# Patient Record
Sex: FEMALE | Race: WHITE | NOT HISPANIC OR LATINO | Employment: OTHER | URBAN - METROPOLITAN AREA
[De-identification: names, ages, dates, MRNs, and addresses within clinical notes are randomized per-mention and may not be internally consistent; named-entity substitution may affect disease eponyms.]

---

## 2017-02-12 LAB
EXTERNAL HEMATOCRIT: 35.3 %
EXTERNAL HEMOGLOBIN: 12.4 G/DL
EXTERNAL SYPHILIS RPR SCREEN: NORMAL

## 2017-08-29 LAB
EXTERNAL ABO GROUPING: NORMAL
EXTERNAL ANTIBODY SCREEN: NORMAL
EXTERNAL HEMATOCRIT: 35.6 %
EXTERNAL HEMOGLOBIN: 12.4 G/DL
EXTERNAL HEPATITIS B SURFACE ANTIGEN: NEGATIVE
EXTERNAL HIV-1 ANTIBODY: NEGATIVE
EXTERNAL PLATELET COUNT: 200 K/ΜL
EXTERNAL RH FACTOR: POSITIVE
EXTERNAL RUBELLA IGG QUANTITATION: NORMAL
EXTERNAL SYPHILIS RPR SCREEN: NORMAL

## 2017-12-12 LAB — EXTERNAL PLATELET COUNT: 186 K/ΜL

## 2018-02-05 PROCEDURE — 87653 STREP B DNA AMP PROBE: CPT | Performed by: NURSE PRACTITIONER

## 2018-02-06 ENCOUNTER — LAB REQUISITION (OUTPATIENT)
Dept: LAB | Facility: HOSPITAL | Age: 33
End: 2018-02-06
Payer: COMMERCIAL

## 2018-02-06 DIAGNOSIS — Z34.83 ENCOUNTER FOR SUPERVISION OF OTHER NORMAL PREGNANCY, THIRD TRIMESTER: ICD-10-CM

## 2018-02-08 LAB — GP B STREP DNA SPEC QL NAA+PROBE: NORMAL

## 2018-02-24 ENCOUNTER — HOSPITAL ENCOUNTER (INPATIENT)
Facility: HOSPITAL | Age: 33
LOS: 1 days | Discharge: HOME | End: 2018-02-25
Attending: OBSTETRICS & GYNECOLOGY | Admitting: OBSTETRICS & GYNECOLOGY
Payer: COMMERCIAL

## 2018-02-24 DIAGNOSIS — Z3A.38 38 WEEKS GESTATION OF PREGNANCY: ICD-10-CM

## 2018-02-24 LAB
ABO GROUP BLD: NORMAL
BASE EXCESS BLDCOA CALC-SCNC: -1.1 MMOL/L (ref 3–11)
BASE EXCESS BLDCOV CALC-SCNC: 0.2 MMOL/L (ref 1–9)
BASOPHILS # BLD MANUAL: 0 THOUSAND/UL (ref 0–0.1)
BASOPHILS NFR MAR MANUAL: 0 % (ref 0–1)
BLD GP AB SCN SERPL QL: NEGATIVE
EOSINOPHIL # BLD MANUAL: 0 THOUSAND/UL (ref 0–0.4)
EOSINOPHIL NFR BLD MANUAL: 0 % (ref 0–6)
ERYTHROCYTE [DISTWIDTH] IN BLOOD BY AUTOMATED COUNT: 13.1 % (ref 11.6–15.1)
HCO3 BLDCOA-SCNC: 24.5 MMOL/L (ref 17.3–27.3)
HCO3 BLDCOV-SCNC: 21.6 MMOL/L (ref 12.2–28.6)
HCT VFR BLD AUTO: 34.2 % (ref 34.8–46.1)
HGB BLD-MCNC: 11.5 G/DL (ref 11.5–15.4)
LG PLATELETS BLD QL SMEAR: PRESENT
LYMPHOCYTES # BLD AUTO: 1.44 THOUSAND/UL (ref 0.6–4.47)
LYMPHOCYTES # BLD AUTO: 13 % (ref 14–44)
MCH RBC QN AUTO: 30.7 PG (ref 26.8–34.3)
MCHC RBC AUTO-ENTMCNC: 33.6 G/DL (ref 31.4–37.4)
MCV RBC AUTO: 91 FL (ref 82–98)
MONOCYTES # BLD AUTO: 0.44 THOUSAND/UL (ref 0–1.22)
MONOCYTES NFR BLD: 4 % (ref 4–12)
MYELOCYTES NFR BLD MANUAL: 1 % (ref 0–1)
NEUTROPHILS # BLD MANUAL: 9.06 THOUSAND/UL (ref 1.85–7.62)
NEUTS SEG NFR BLD AUTO: 82 % (ref 43–75)
NRBC BLD AUTO-RTO: 0 /100 WBCS
O2 CT VFR BLDCOA CALC: 9.5 ML/DL
OXYHGB MFR BLDCOA: 41 %
OXYHGB MFR BLDCOV: 76.1 %
PCO2 BLDCOA: 43.8 MM[HG] (ref 30–60)
PCO2 BLDCOV: 27.5 MM HG (ref 27–43)
PH BLDCOA: 7.37 [PH] (ref 7.23–7.43)
PH BLDCOV: 7.51 [PH] (ref 7.19–7.49)
PLATELET # BLD AUTO: 154 THOUSANDS/UL (ref 149–390)
PLATELET BLD QL SMEAR: ADEQUATE
PMV BLD AUTO: 10.6 FL (ref 8.9–12.7)
PO2 BLDCOA: 17.8 MM HG (ref 5–25)
PO2 BLDCOV: 27.3 MM HG (ref 15–45)
RBC # BLD AUTO: 3.74 MILLION/UL (ref 3.81–5.12)
RH BLD: POSITIVE
SAO2 % BLDCOV: 17.6 ML/DL
SPECIMEN EXPIRATION DATE: NORMAL
TOTAL CELLS COUNTED SPEC: 100
WBC # BLD AUTO: 11.05 THOUSAND/UL (ref 4.31–10.16)

## 2018-02-24 PROCEDURE — 86900 BLOOD TYPING SEROLOGIC ABO: CPT | Performed by: OBSTETRICS & GYNECOLOGY

## 2018-02-24 PROCEDURE — 86850 RBC ANTIBODY SCREEN: CPT | Performed by: OBSTETRICS & GYNECOLOGY

## 2018-02-24 PROCEDURE — 85007 BL SMEAR W/DIFF WBC COUNT: CPT | Performed by: OBSTETRICS & GYNECOLOGY

## 2018-02-24 PROCEDURE — 86592 SYPHILIS TEST NON-TREP QUAL: CPT | Performed by: OBSTETRICS & GYNECOLOGY

## 2018-02-24 PROCEDURE — 86901 BLOOD TYPING SEROLOGIC RH(D): CPT | Performed by: OBSTETRICS & GYNECOLOGY

## 2018-02-24 PROCEDURE — 82805 BLOOD GASES W/O2 SATURATION: CPT | Performed by: OBSTETRICS & GYNECOLOGY

## 2018-02-24 PROCEDURE — 99202 OFFICE O/P NEW SF 15 MIN: CPT

## 2018-02-24 PROCEDURE — 85027 COMPLETE CBC AUTOMATED: CPT | Performed by: OBSTETRICS & GYNECOLOGY

## 2018-02-24 RX ORDER — TRISODIUM CITRATE DIHYDRATE AND CITRIC ACID MONOHYDRATE 500; 334 MG/5ML; MG/5ML
30 SOLUTION ORAL 4 TIMES DAILY PRN
Status: DISCONTINUED | OUTPATIENT
Start: 2018-02-24 | End: 2018-02-25 | Stop reason: HOSPADM

## 2018-02-24 RX ORDER — BISACODYL 10 MG
10 SUPPOSITORY, RECTAL RECTAL DAILY PRN
Status: DISCONTINUED | OUTPATIENT
Start: 2018-02-24 | End: 2018-02-25 | Stop reason: HOSPADM

## 2018-02-24 RX ORDER — DOCUSATE SODIUM 100 MG/1
100 CAPSULE, LIQUID FILLED ORAL 2 TIMES DAILY PRN
Status: DISCONTINUED | OUTPATIENT
Start: 2018-02-24 | End: 2018-02-25 | Stop reason: HOSPADM

## 2018-02-24 RX ORDER — ACETAMINOPHEN 325 MG/1
650 TABLET ORAL EVERY 6 HOURS PRN
Status: DISCONTINUED | OUTPATIENT
Start: 2018-02-24 | End: 2018-02-25 | Stop reason: HOSPADM

## 2018-02-24 RX ORDER — SIMETHICONE 80 MG
80 TABLET,CHEWABLE ORAL 4 TIMES DAILY PRN
Status: DISCONTINUED | OUTPATIENT
Start: 2018-02-24 | End: 2018-02-25 | Stop reason: HOSPADM

## 2018-02-24 RX ORDER — SENNOSIDES 8.6 MG
1 TABLET ORAL
Status: DISCONTINUED | OUTPATIENT
Start: 2018-02-24 | End: 2018-02-25 | Stop reason: HOSPADM

## 2018-02-24 RX ORDER — CALCIUM CARBONATE 200(500)MG
1000 TABLET,CHEWABLE ORAL DAILY PRN
Status: DISCONTINUED | OUTPATIENT
Start: 2018-02-24 | End: 2018-02-25 | Stop reason: HOSPADM

## 2018-02-24 RX ORDER — DIPHENHYDRAMINE HCL 25 MG
25 TABLET ORAL EVERY 6 HOURS PRN
Status: DISCONTINUED | OUTPATIENT
Start: 2018-02-24 | End: 2018-02-25 | Stop reason: HOSPADM

## 2018-02-24 RX ORDER — OXYCODONE HYDROCHLORIDE AND ACETAMINOPHEN 5; 325 MG/1; MG/1
2 TABLET ORAL EVERY 4 HOURS PRN
Status: DISCONTINUED | OUTPATIENT
Start: 2018-02-24 | End: 2018-02-25 | Stop reason: HOSPADM

## 2018-02-24 RX ORDER — SODIUM CHLORIDE, SODIUM LACTATE, POTASSIUM CHLORIDE, CALCIUM CHLORIDE 600; 310; 30; 20 MG/100ML; MG/100ML; MG/100ML; MG/100ML
125 INJECTION, SOLUTION INTRAVENOUS CONTINUOUS
Status: DISCONTINUED | OUTPATIENT
Start: 2018-02-24 | End: 2018-02-24

## 2018-02-24 RX ORDER — OXYTOCIN/RINGER'S LACTATE 30/500 ML
PLASTIC BAG, INJECTION (ML) INTRAVENOUS
Status: COMPLETED
Start: 2018-02-24 | End: 2018-02-24

## 2018-02-24 RX ORDER — DIAPER,BRIEF,INFANT-TODD,DISP
1 EACH MISCELLANEOUS AS NEEDED
Status: DISCONTINUED | OUTPATIENT
Start: 2018-02-24 | End: 2018-02-25 | Stop reason: HOSPADM

## 2018-02-24 RX ORDER — OXYTOCIN/RINGER'S LACTATE 30/500 ML
250 PLASTIC BAG, INJECTION (ML) INTRAVENOUS ONCE
Status: DISCONTINUED | OUTPATIENT
Start: 2018-02-24 | End: 2018-02-25 | Stop reason: HOSPADM

## 2018-02-24 RX ORDER — IBUPROFEN 600 MG/1
600 TABLET ORAL EVERY 6 HOURS PRN
Status: DISCONTINUED | OUTPATIENT
Start: 2018-02-24 | End: 2018-02-25 | Stop reason: HOSPADM

## 2018-02-24 RX ORDER — OXYCODONE HYDROCHLORIDE AND ACETAMINOPHEN 5; 325 MG/1; MG/1
1 TABLET ORAL EVERY 4 HOURS PRN
Status: DISCONTINUED | OUTPATIENT
Start: 2018-02-24 | End: 2018-02-25 | Stop reason: HOSPADM

## 2018-02-24 RX ORDER — ONDANSETRON 2 MG/ML
4 INJECTION INTRAMUSCULAR; INTRAVENOUS EVERY 8 HOURS PRN
Status: DISCONTINUED | OUTPATIENT
Start: 2018-02-24 | End: 2018-02-25 | Stop reason: HOSPADM

## 2018-02-24 RX ORDER — OXYTOCIN/RINGER'S LACTATE 30/500 ML
PLASTIC BAG, INJECTION (ML) INTRAVENOUS
Status: DISCONTINUED
Start: 2018-02-24 | End: 2018-02-24 | Stop reason: WASHOUT

## 2018-02-24 RX ORDER — MAGNESIUM HYDROXIDE/ALUMINUM HYDROXICE/SIMETHICONE 120; 1200; 1200 MG/30ML; MG/30ML; MG/30ML
15 SUSPENSION ORAL EVERY 6 HOURS PRN
Status: DISCONTINUED | OUTPATIENT
Start: 2018-02-24 | End: 2018-02-25 | Stop reason: HOSPADM

## 2018-02-24 RX ADMIN — DOCUSATE SODIUM 100 MG: 100 CAPSULE, LIQUID FILLED ORAL at 18:51

## 2018-02-24 RX ADMIN — WITCH HAZEL 1 PAD: 500 SOLUTION RECTAL; TOPICAL at 21:53

## 2018-02-24 RX ADMIN — IBUPROFEN 600 MG: 600 TABLET, FILM COATED ORAL at 12:39

## 2018-02-24 RX ADMIN — HYDROCORTISONE 1 APPLICATION: 1 CREAM TOPICAL at 21:53

## 2018-02-24 RX ADMIN — IBUPROFEN 600 MG: 600 TABLET, FILM COATED ORAL at 21:55

## 2018-02-24 RX ADMIN — BENZOCAINE AND MENTHOL 1 APPLICATION: 20; .5 SPRAY TOPICAL at 18:51

## 2018-02-24 RX ADMIN — Medication 30 UNITS: at 12:26

## 2018-02-24 NOTE — DISCHARGE SUMMARY
Discharge Summary - Luigi Ramirez 28 y o  female MRN: [id number]    Unit/Bed#: L&D 322-01 Encounter: [id number]    Admission Date: 2018     Discharge Date: 2018    Admitting Diagnosis: 38 week gestation    Discharge Diagnosis: same    Procedures: spontaneous vaginal delivery    Attending: Kourtney Sommer DO    Hospital Course: Luigi Ramirez is a 28 y o  [id number] who was admitted at 41 wks for labor  She underwent an uncomplicated spontaneous vaginal delivery  , wt 7 lb 15 oz, APGARS 9/9 at 1 minute and 5 minutes respectively, was transferred to  nursery  Patient tolerated the procedure well and was transferred to recovery in stable condition  On day of discharge, she was ambulating and able to reasonably perform all ADLs  She was voiding and had appropriate bowel function  Pain was well controlled  She was discharged home on postpartum day #1 without complications  Patient was instructed to follow up with her OB as an outpatient and was given appropriate warnings to call provider if she develops signs of infection or uncontrolled pain  Complications: None    Condition at discharge: good     Discharge instructions/Information to patient and family:   See after visit summary for information provided to patient and family  Provisions for Follow-Up Care:  See after visit summary for information related to follow-up care and any pertinent home health orders  Disposition: Home    Planned Readmission: No    Discharge Medications: For a complete list of the patient's medications, please refer to her med rec      Jeronimo Mcpherson MD

## 2018-02-24 NOTE — DISCHARGE INSTRUCTIONS
Vaginal Delivery   WHAT YOU SHOULD KNOW:   A vaginal delivery is the birth of your baby through your vagina (birth canal)  AFTER YOU LEAVE:   Medicines:  · NSAIDs  help decrease swelling and pain or fever  This medicine is available with or without a doctor's order  NSAIDs can cause stomach bleeding or kidney problems in certain people  If you take blood thinner medicine, always ask your healthcare provider if NSAIDs are safe for you  Always read the medicine label and follow directions  · Take your medicine as directed  Call your healthcare provider if you think your medicine is not helping or if you have side effects  Tell him if you are allergic to any medicine  Keep a list of the medicines, vitamins, and herbs you take  Include the amounts, and when and why you take them  Bring the list or the pill bottles to follow-up visits  Carry your medicine list with you in case of an emergency  Follow up with your primary healthcare provider:  Most women need to return 6 weeks after a vaginal delivery  Ask about how to care for your wounds or stitches  Write down your questions so you remember to ask them during your visits  Activity:  Rest as much as possible  Try to keep all activities short  You may be able to do some exercise soon after you have your baby  Talk with your primary healthcare provider before you start exercising  If you work outside the home, ask when you can return to your job  Kegel exercises:  Kegel exercises may help your vaginal and rectal muscles heal faster  You can do Kegel exercises by tightening and relaxing the muscles around your vagina  Kegel exercises help make the muscles stronger  Breast care:  When your milk comes in, your breasts may feel full and hard  Ask how to care for your breasts, even if you are not breastfeeding  Constipation:  Do not try to push the bowel movement out if it is too hard   High-fiber foods, extra liquids, and regular exercise can help you prevent constipation  Examples of high-fiber foods are fruit and bran  Prune juice and water are good liquids to drink  Regular exercise helps your digestive system work  You may also be told to take over-the-counter fiber and stool softener medicines  Take these items as directed  Hemorrhoids:  Pregnancy can cause severe hemorrhoids  You may have rectal pain because of the hemorrhoids  Ask how to prevent or treat hemorrhoids  Perineum care: Your perineum is the area between your vagina and anus  Keep the area clean and dry to help it heal and to prevent infection  Wash the area gently with soap and water when you bathe or shower  Rinse your perineum with warm water when you use the toilet  Your primary healthcare provider may suggest you use a warm sitz bath to help decrease pain  A sitz bath is a bathtub or basin filled to hip level  Stay in the sitz bath for 20 to 30 minutes, or as directed  Vaginal discharge: You will have vaginal discharge, called lochia, after your delivery  The lochia is bright red the first day or two after the birth  By the fourth day, the amount decreases, and it turns red-brown  Use a sanitary pad rather than a tampon to prevent a vaginal infection  It is normal to have lochia up to 8 weeks after your baby is born  Monthly periods: Your period may start again within 7 to 12 weeks after your baby is born  If you are breastfeeding, it may take longer for your period to start again  You can still get pregnant again even though you do not have your monthly period  Talk with your primary healthcare provider about a birth control method that will be good for you if you do not want to get pregnant  Mood changes: Many new mothers have some kind of mood changes after delivery  Some of these changes occur because of lack of sleep, hormone changes, and caring for a new baby  Some mood changes can be more serious, such as postpartum depression   Talk with your primary healthcare provider if you feel unable to care for yourself or your baby  Sexual activity:  You may need to avoid sex for 6 to 7 weeks after you have your baby  You may notice you have a decreased desire for sex, or sex may be painful  You may need to use a vaginal lubricant (gel) to help make sex more comfortable  Contact your primary healthcare provider if:   · You have heavy vaginal bleeding that fills 1 or more sanitary pads in 1 hour  · You have a fever  · Your pain does not go away, or gets worse  · The skin between your vagina and rectum is swollen, warm, or red  · You have swollen, hard, or painful breasts  · You feel very sad or depressed  · You feel more tired than usual      · You have questions or concerns about your condition or care  Seek care immediately or call 911 if:   · You have pus or yellow drainage coming from your vagina or wound  · You are urinating very little, or not at all  · Your arm or leg feels warm, tender, and painful  It may look swollen and red  · You feel lightheaded, have sudden and worsening chest pain, or trouble breathing  You may have more pain when you take deep breaths or cough, or you may cough up blood  © 2014 [address] is for End User's use only and may not be sold, redistributed or otherwise used for commercial purposes  All illustrations and images included in CareNotes® are the copyrighted property of [employer]  or [employer]  The above information is an  only  It is not intended as medical advice for individual conditions or treatments  Talk to your doctor, nurse or pharmacist before following any medical regimen to see if it is safe and effective for you

## 2018-02-24 NOTE — H&P
H&P - Obstetrics   Som Baumann 28 y o  female MRN: [id number]  Unit/Bed#: L&D 329-01 Encounter: [id number]      History of Present Illness     Chief Complaint: Contractions    HPI:  Som Baumann is a 28 y o   at 38w2d weeks gestation who is being admitted for Active labor  Her current obstetrical history is significant for prior   Contractions: Date/time of onset: 2018 at 0200, Frequency: Every 3 minutes, Duration: 60 seconds and Intensity: strong  Leakage of fluid: None  Vaginal Bleeding: None  Fetal movement: present  OBHx: [id number]    Baby complications/comments: none     Review of Systems   Constitutional: Negative for chills, diaphoresis, fatigue and fever  HENT: Negative  Respiratory: Negative for cough, choking, chest tightness, shortness of breath, wheezing and stridor  Cardiovascular: Negative for chest pain, palpitations and leg swelling  Gastrointestinal: Negative for abdominal pain, blood in stool, constipation, diarrhea, nausea and vomiting  Genitourinary: Negative for dyspareunia, dysuria, frequency, hematuria, vaginal bleeding, vaginal discharge and vaginal pain  Musculoskeletal: Negative  Neurological: Negative for dizziness, seizures, syncope, facial asymmetry, weakness, light-headedness, numbness and headaches  Psychiatric/Behavioral: Negative  Historical Information   PMH: none  PSH: none  Social History   History   Alcohol use Not on file     History   Drug use: Unknown     History   Smoking Status    Not on file   Smokeless Tobacco    Not on file     Family History: non-contributory    Meds/Allergies      No prescriptions prior to admission  Allergies not on file    OBJECTIVE:    Vitals: Blood pressure 112/61, pulse 83, temperature 98 °F (36 7 °C), temperature source Oral, resp  rate 18, last menstrual period 2017  There is no height or weight on file to calculate BMI      Physical Exam   Constitutional: She is oriented to person, place, and time  She appears well-developed and well-nourished  HENT:   Head: Normocephalic and atraumatic  Eyes: Conjunctivae and EOM are normal    Neck: Normal range of motion  Neck supple  Cardiovascular: Normal rate, regular rhythm, normal heart sounds and intact distal pulses  Exam reveals no gallop and no friction rub  No murmur heard  Pulmonary/Chest: Effort normal and breath sounds normal  No respiratory distress  She has no wheezes  She has no rales  Abdominal: Soft  Bowel sounds are normal  She exhibits no distension  There is no tenderness  There is no rebound and no guarding  Genitourinary: Vagina normal    Genitourinary Comments: Gravid uterus   Musculoskeletal: Normal range of motion  Neurological: She is alert and oriented to person, place, and time  Skin: Skin is warm and dry  Psychiatric: She has a normal mood and affect  Her behavior is normal  Judgment and thought content normal        Cervix: Dilation: 6cm, Effacement:  90%, Station:  0, Consistency: soft and Position:  anterior       Fetal heart rate: Baseline: 140 bpm, Variability: Moderate 6 - 25 bpm, Accelerations: Reactive, Decelerations: Absent and Category 1        [place]: regular, every 3 minutes       EFW: 7lb    GBS: negative  Prenatal Labs: I have personally reviewed pertinent reports  O+  Ab  H/h: 12  6  PLT: 200K  HBsAg neg  HIV neg  RPR nR  Varicella/Rubella Immune  GC/CT neg  1hr glucola 94  GBS neg    Invasive Devices          No matching active lines, drains, or airways            Assessment/Plan     ASSESSMENT:    Marsha Ackerman 28 y o   at 38w2d weeks gestation who is being admitted for Active labor      PLAN:   1) Admit   2) CBC, RPR, Blood Type   3) Analgesia and/or epidural at patient request   4) Anticipate    5) Case discussed with Dr Bobbi Bright MD  OB/GYN PGY-2  2018 11:25 AM

## 2018-02-24 NOTE — L&D DELIVERY NOTE
DELIVERY NOTE  Shahzad Herman 28 y o  female MRN: [id number]  Unit/Bed#: L&D 322-01 Encounter: [id number]    Obstetrician:   Dr Gonzalo Crum    Assistant: Dr Arlette Mcrae    Pre-Delivery Diagnosis: Term pregnancy  Spontaneous labor  Single fetus    Post-Delivery Diagnosis: Same as above - Delivered  Viable male fetus    Procedure: Spontaneous vaginal delivery    Delivery Date and Time: 2018 @ 1150    Estimated Blood Loss:  216RS           Complications:  None    Brief description of labor:  Please see additional notes for details of the labor course  Blood Gases (pH/BE): Omer (7 512/0 2) Art (7 365/-1  1)    Description of Procedure: With maternal expulsive efforts, the patient delivered a viable male   The position at delivery was direct OA  The fetal vertex delivered spontaneously  There was a double nuchal cord, 2 loose loops around the fetal neck, easily reduced with gentle traction  The anterior shoulder delivered atraumatically with maternal expulsive forces and the assistance of gentle downward traction  The posterior shoulder delivered with maternal expulsive forces and the assistance of gentle upward traction  The remainder of the fetus delivered spontaneously  Upon delivery, the  was placed on the mother's abdomen and the umbilical cord was clamped and cut  The  resuscitator evaluated the  at bedside  Arterial and venous cord blood gases and cord blood were collected for analysis  These were sent to the lab  Active management of the third stage of labor included uterine massage and administration of IV Pitocin at 250 natalie-units per minute  The uterus was noted to contract down well  The placenta delivered spontaneously and was noted to have a centrally-inserted 3-vessel cord  It was sent for storage  The vagina, cervix, perineum, and rectum were inspected and there was noted to be no lacerations present      At the conclusion of the delivery, all needle, sponge, and instrument counts were noted to be correct  The patient tolerated the procedure well and was allowed to recover in labor and delivery room  Dr Paul Ferguson DO was present      Rosmery Smart MD  OB/GYN PGY-2  2/24/2018 12:09 PM

## 2018-02-25 VITALS
SYSTOLIC BLOOD PRESSURE: 98 MMHG | WEIGHT: 180 LBS | HEART RATE: 77 BPM | OXYGEN SATURATION: 99 % | TEMPERATURE: 98.2 F | HEIGHT: 67 IN | BODY MASS INDEX: 28.25 KG/M2 | RESPIRATION RATE: 18 BRPM | DIASTOLIC BLOOD PRESSURE: 58 MMHG

## 2018-02-25 RX ORDER — DIAPER,BRIEF,INFANT-TODD,DISP
1 EACH MISCELLANEOUS 4 TIMES DAILY PRN
Qty: 30 G | Refills: 0 | Status: SHIPPED | OUTPATIENT
Start: 2018-02-25

## 2018-02-25 RX ORDER — ACETAMINOPHEN 325 MG/1
TABLET ORAL
Qty: 30 TABLET | Refills: 0 | Status: SHIPPED | OUTPATIENT
Start: 2018-02-25

## 2018-02-25 RX ORDER — SIMETHICONE 80 MG
80 TABLET,CHEWABLE ORAL 4 TIMES DAILY PRN
Qty: 30 TABLET | Refills: 0 | Status: SHIPPED | OUTPATIENT
Start: 2018-02-25

## 2018-02-25 RX ORDER — DOCUSATE SODIUM 100 MG/1
100 CAPSULE, LIQUID FILLED ORAL 2 TIMES DAILY PRN
Qty: 10 CAPSULE | Refills: 0 | Status: SHIPPED | OUTPATIENT
Start: 2018-02-25

## 2018-02-25 RX ORDER — IBUPROFEN 600 MG/1
600 TABLET ORAL EVERY 6 HOURS PRN
Qty: 30 TABLET | Refills: 0 | Status: SHIPPED | OUTPATIENT
Start: 2018-02-25

## 2018-02-25 RX ADMIN — IBUPROFEN 600 MG: 600 TABLET, FILM COATED ORAL at 04:28

## 2018-02-25 RX ADMIN — IBUPROFEN 600 MG: 600 TABLET, FILM COATED ORAL at 15:32

## 2018-02-25 NOTE — PLAN OF CARE
INFECTION - ADULT     Absence or prevention of progression during hospitalization Progressing        PAIN - ADULT     Verbalizes/displays adequate comfort level or baseline comfort level Progressing        POSTPARTUM     Experiences normal postpartum course Progressing     Appropriate maternal -  bonding Progressing     Establishment of infant feeding pattern Progressing        SAFETY ADULT     Patient will remain free of falls Progressing     Maintain or return to baseline ADL function Progressing     Maintain or return mobility status to optimal level Progressing

## 2018-02-25 NOTE — PROGRESS NOTES
Progress Note - OB/GYN   Johnathon Velasquez 28 y o  female MRN: [id number]  Unit/Bed#: L&D 309-01 Encounter: [id number]    Assessment:  28 y o   s/p Spontaneous Vaginal Delivery Postpartum day  1  Patient recovering well, Stable  Disposition: Anticipate discharge today, 2018     Plan:  Continue routine post partum care  Pain management PRN  Encourage ambulation  Encourage breastfeeding    Subjective/Objective   Chief Complaint:    Postpartum state    Subjective:   Patient doing well with no complaints this morning  She desires early discharge today- discussed reasons to call office until she is seen at her postpartum visit including signs of infection, excessive bleeding or uncontrolled pain with medications she is taking    Pain: yes, cramping, improved with meds  Tolerating PO: yes  Voiding: yes  Flatus: yes  BM: no  Ambulating: yes  Breastfeeding:  yes  Chest pain: no  Shortness of breath: no  Leg pain: no  Lochia: minimal    Objective:     Vitals: Temp:  [97 8 °F (36 6 °C)-98 4 °F (36 9 °C)] 98 1 °F (36 7 °C)  HR:  [56-92] 67  Resp:  [12-19] 17  BP: ()/(56-78) 107/75       Intake/Output Summary (Last 24 hours) at 18 0345  Last data filed at 18   Gross per 24 hour   Intake              500 ml   Output             1300 ml   Net             -800 ml         Physical Exam:   General: NAD, alert, oriented  Cardio: Regular rate and rhythm, no murmur  Resp: nonlabored breathing, clear to auscultation bilaterally  Abdomen: Soft, no distension/rebound/guarding/tenderness   Fundus: Firm, non-tender, fundus: 1 cm below umbilicus  G/U: minimal lochia noted on pad  Lower Extremities: Non-tender, no palpable cords    Medications:  Current Facility-Administered Medications   Medication Dose Route Frequency    acetaminophen (TYLENOL) tablet 650 mg  650 mg Oral Q6H PRN    aluminum-magnesium hydroxide-simethicone (MYLANTA) 200-200-20 mg/5 mL oral suspension 15 mL  15 mL Oral Q6H PRN    benzocaine-menthol-lanolin-aloe (DERMOPLAST) 20-0 5 % topical spray   Topical 4x Daily PRN    bisacodyl (DULCOLAX) rectal suppository 10 mg  10 mg Rectal Daily PRN    calcium carbonate (TUMS) chewable tablet 1,000 mg  1,000 mg Oral Daily PRN    diphenhydrAMINE (BENADRYL) tablet 25 mg  25 mg Oral Q6H PRN    docusate sodium (COLACE) capsule 100 mg  100 mg Oral BID PRN    hydrocortisone 1 % cream 1 application  1 application Topical PRN    ibuprofen (MOTRIN) tablet 600 mg  600 mg Oral Q6H PRN    ondansetron (ZOFRAN) injection 4 mg  4 mg Intravenous Q8H PRN    oxyCODONE-acetaminophen (PERCOCET) 5-325 mg per tablet 1 tablet  1 tablet Oral Q4H PRN    oxyCODONE-acetaminophen (PERCOCET) 5-325 mg per tablet 2 tablet  2 tablet Oral Q4H PRN    oxytocin (PITOCIN) 30 Units in lactated ringers 500 mL infusion  250 edwige-units/min Intravenous Once    senna (SENOKOT) tablet 8 6 mg  1 tablet Oral HS PRN    simethicone (MYLICON) chewable tablet 80 mg  80 mg Oral 4x Daily PRN    sod citrate-citric acid (BICITRA) oral solution 30 mL  30 mL Oral 4x Daily PRN    witch hazel-glycerin (TUCKS) topical pad 1 pad  1 pad Topical PRN       Labs:   Recent Results (from the past 24 hour(s))   Blood gas, arterial, cord    Collection Time: 02/24/18 11:56 AM   Result Value Ref Range    pH, Cord Art 7 365 7 230 - 7 430    pCO2, Cord Art 43 8 30 0 - 60 0    pO2, Cord Art 17 8 5 0 - 25 0 mm HG    HCO3, Cord Art 24 5 17 3 - 27 3 mmol/L    Base Exc, Cord Art -1 1 (L) 3 0 - 11 0 mmol/L    O2 Content, Cord Art 9 5 ml/dl    O2 Hgb, Arterial Cord 41 0 %   Blood gas, venous, cord    Collection Time: 02/24/18 11:56 AM   Result Value Ref Range    pH, Cord Omer 7 512 (H) 7 190 - 7 490    pCO2, Cord Omer 27 5 27 0 - 43 0 mm HG    pO2, Cord Omer 27 3 15 0 - 45 0 mm HG    HCO3, Cord Omer 21 6 12 2 - 28 6 mmol/L    Base Exc, Cord Omer 0 2 (L) 1 0 - 9 0 mmol/L    O2 Cont, Cord Omer 17 6 mL/dL    O2 HGB,VENOUS CORD 76 1 %   Type and screen    Collection Time: 02/24/18 11:57 AM   Result Value Ref Range    ABO Grouping O     Rh Factor Positive     Antibody Screen Negative     Specimen Expiration Date [id number]    CBC and differential    Collection Time: 02/24/18 11:57 AM   Result Value Ref Range    WBC 11 05 (H) 4 31 - 10 16 Thousand/uL    RBC 3 74 (L) 3 81 - 5 12 Million/uL    Hemoglobin 11 5 11 5 - 15 4 g/dL    Hematocrit 34 2 (L) 34 8 - 46 1 %    MCV 91 82 - 98 fL    MCH 30 7 26 8 - 34 3 pg    MCHC 33 6 31 4 - 37 4 g/dL    RDW 13 1 11 6 - 15 1 %    MPV 10 6 8 9 - 12 7 fL    Platelets 546 272 - 013 Thousands/uL    nRBC 0 /100 WBCs   Manual Differential(PHLEBS Do Not Order)    Collection Time: 02/24/18 11:57 AM   Result Value Ref Range    Segmented % 82 (H) 43 - 75 %    Lymphocytes % 13 (L) 14 - 44 %    Monocytes % 4 4 - 12 %    Eosinophils % 0 0 - 6 %    Basophils % 0 0 - 1 %    Myelocytes % 1 0 - 1 %    Absolute Neutrophils 9 06 (H) 1 85 - 7 62 Thousand/uL    Lymphocytes Absolute 1 44 0 60 - 4 47 Thousand/uL    Monocytes Absolute 0 44 0 00 - 1 22 Thousand/uL    Eosinophils Absolute 0 00 0 00 - 0 40 Thousand/uL    Basophils Absolute 0 00 0 00 - 0 10 Thousand/uL    Total Counted 100     Platelet Estimate Adequate Adequate    Large Platelet Present          Meet Kirsty  2/25/2018  8:53 AM

## 2018-02-25 NOTE — PLAN OF CARE
INFECTION - ADULT     Absence or prevention of progression during hospitalization Adequate for Discharge        PAIN - ADULT     Verbalizes/displays adequate comfort level or baseline comfort level Adequate for Discharge        POSTPARTUM     Experiences normal postpartum course Adequate for Discharge     Appropriate maternal -  bonding Adequate for Discharge     Establishment of infant feeding pattern Adequate for Discharge        SAFETY ADULT     Patient will remain free of falls Adequate for Discharge     Maintain or return to baseline ADL function Adequate for Discharge     Maintain or return mobility status to optimal level Adequate for Discharge

## 2018-02-26 LAB — RPR SER QL: NORMAL

## 2018-03-03 LAB — PLACENTA IN STORAGE: NORMAL
